# Patient Record
Sex: FEMALE | Race: BLACK OR AFRICAN AMERICAN | Employment: FULL TIME | URBAN - METROPOLITAN AREA
[De-identification: names, ages, dates, MRNs, and addresses within clinical notes are randomized per-mention and may not be internally consistent; named-entity substitution may affect disease eponyms.]

---

## 2018-07-03 ENCOUNTER — HOSPITAL ENCOUNTER (OUTPATIENT)
Age: 48
Discharge: HOME | End: 2018-07-03
Attending: INTERNAL MEDICINE
Payer: COMMERCIAL

## 2018-07-03 DIAGNOSIS — D35.2 PITUITARY ADENOMA (HCC): ICD-10-CM

## 2018-07-03 LAB — CREAT UR-MCNC: 0.9 MG/DL (ref 0.6–1.3)

## 2018-07-03 PROCEDURE — 74011636320 HC RX REV CODE- 636/320

## 2018-07-03 PROCEDURE — A9575 INJ GADOTERATE MEGLUMI 0.1ML: HCPCS

## 2018-07-03 PROCEDURE — 70553 MRI BRAIN STEM W/O & W/DYE: CPT

## 2018-07-03 PROCEDURE — 82565 ASSAY OF CREATININE: CPT

## 2018-07-03 RX ADMIN — GADOTERATE MEGLUMINE 16 ML: 376.9 INJECTION INTRAVENOUS at 16:00

## 2019-03-15 ENCOUNTER — HOSPITAL ENCOUNTER (OUTPATIENT)
Age: 49
Discharge: HOME | End: 2019-03-15
Attending: INTERNAL MEDICINE
Payer: COMMERCIAL

## 2019-03-15 DIAGNOSIS — D35.2 BENIGN NEOPLASM OF PITUITARY GLAND (HCC): ICD-10-CM

## 2019-03-15 PROCEDURE — A9575 INJ GADOTERATE MEGLUMI 0.1ML: HCPCS

## 2019-03-15 PROCEDURE — 70553 MRI BRAIN STEM W/O & W/DYE: CPT

## 2019-03-15 PROCEDURE — 74011636320 HC RX REV CODE- 636/320

## 2019-03-15 RX ADMIN — GADOTERATE MEGLUMINE 17 ML: 376.9 INJECTION INTRAVENOUS at 14:00

## 2021-08-24 ENCOUNTER — TRANSCRIBE ORDER (OUTPATIENT)
Dept: SCHEDULING | Age: 51
End: 2021-08-24

## 2021-08-24 DIAGNOSIS — D35.3 BENIGN NEOPLASM OF PITUITARY GLAND AND CRANIOPHARYNGEAL DUCT (POUCH) (HCC): Primary | ICD-10-CM

## 2021-08-24 DIAGNOSIS — D35.2 BENIGN NEOPLASM OF PITUITARY GLAND AND CRANIOPHARYNGEAL DUCT (POUCH) (HCC): Primary | ICD-10-CM

## 2021-09-14 ENCOUNTER — HOSPITAL ENCOUNTER (OUTPATIENT)
Age: 51
Discharge: HOME | End: 2021-09-14
Attending: INTERNAL MEDICINE
Payer: COMMERCIAL

## 2021-09-14 VITALS — WEIGHT: 200 LBS

## 2021-09-14 DIAGNOSIS — D35.2 BENIGN NEOPLASM OF PITUITARY GLAND AND CRANIOPHARYNGEAL DUCT (POUCH) (HCC): ICD-10-CM

## 2021-09-14 DIAGNOSIS — D35.3 BENIGN NEOPLASM OF PITUITARY GLAND AND CRANIOPHARYNGEAL DUCT (POUCH) (HCC): ICD-10-CM

## 2021-09-14 LAB — CREAT UR-MCNC: 1 MG/DL (ref 0.6–1.3)

## 2021-09-14 PROCEDURE — 70553 MRI BRAIN STEM W/O & W/DYE: CPT

## 2021-09-14 PROCEDURE — 74011250636 HC RX REV CODE- 250/636

## 2021-09-14 PROCEDURE — 82565 ASSAY OF CREATININE: CPT

## 2021-09-14 PROCEDURE — A9577 INJ MULTIHANCE: HCPCS

## 2021-09-14 RX ADMIN — GADOBENATE DIMEGLUMINE 17 ML: 529 INJECTION, SOLUTION INTRAVENOUS at 15:42

## 2021-11-24 ENCOUNTER — TRANSCRIBE ORDER (OUTPATIENT)
Dept: SCHEDULING | Age: 51
End: 2021-11-24

## 2021-11-24 DIAGNOSIS — D35.2 PITUITARY ADENOMA (HCC): Primary | ICD-10-CM

## 2022-05-02 ENCOUNTER — HOSPITAL ENCOUNTER (OUTPATIENT)
Age: 52
Discharge: HOME | End: 2022-05-02
Attending: NEUROLOGICAL SURGERY
Payer: COMMERCIAL

## 2022-05-02 DIAGNOSIS — D35.2 PITUITARY ADENOMA (HCC): ICD-10-CM

## 2022-05-02 LAB — CREAT UR-MCNC: 0.9 MG/DL (ref 0.6–1.3)

## 2022-05-02 PROCEDURE — A9576 INJ PROHANCE MULTIPACK: HCPCS

## 2022-05-02 PROCEDURE — 74011250636 HC RX REV CODE- 250/636

## 2022-05-02 PROCEDURE — 70553 MRI BRAIN STEM W/O & W/DYE: CPT

## 2022-05-02 PROCEDURE — 82565 ASSAY OF CREATININE: CPT

## 2022-05-02 RX ADMIN — GADOTERIDOL 20 ML: 279.3 INJECTION, SOLUTION INTRAVENOUS at 08:25

## 2024-02-09 ENCOUNTER — OFFICE VISIT (OUTPATIENT)
Age: 54
End: 2024-02-09
Payer: COMMERCIAL

## 2024-02-09 VITALS
HEART RATE: 79 BPM | BODY MASS INDEX: 32.14 KG/M2 | TEMPERATURE: 97.3 F | DIASTOLIC BLOOD PRESSURE: 70 MMHG | RESPIRATION RATE: 16 BRPM | HEIGHT: 66 IN | SYSTOLIC BLOOD PRESSURE: 110 MMHG | OXYGEN SATURATION: 95 % | WEIGHT: 200 LBS

## 2024-02-09 DIAGNOSIS — Z79.01 LONG TERM (CURRENT) USE OF ANTICOAGULANTS: ICD-10-CM

## 2024-02-09 DIAGNOSIS — R07.89 OTHER CHEST PAIN: ICD-10-CM

## 2024-02-09 DIAGNOSIS — I27.20 PULMONARY HYPERTENSION (HCC): ICD-10-CM

## 2024-02-09 DIAGNOSIS — I26.93 SINGLE SUBSEGMENTAL PULMONARY EMBOLISM WITHOUT ACUTE COR PULMONALE (HCC): ICD-10-CM

## 2024-02-09 DIAGNOSIS — R01.1 SYSTOLIC MURMUR: ICD-10-CM

## 2024-02-09 DIAGNOSIS — I10 PRIMARY HYPERTENSION: Primary | ICD-10-CM

## 2024-02-09 DIAGNOSIS — R06.02 EXERTIONAL SHORTNESS OF BREATH: ICD-10-CM

## 2024-02-09 PROBLEM — R53.83 FATIGUE: Status: ACTIVE | Noted: 2022-12-15

## 2024-02-09 PROBLEM — E55.9 VITAMIN D DEFICIENCY: Status: ACTIVE | Noted: 2023-09-20

## 2024-02-09 PROBLEM — R51.9 HEADACHE: Status: ACTIVE | Noted: 2022-12-15

## 2024-02-09 PROBLEM — E66.9 OBESITY: Status: ACTIVE | Noted: 2023-11-20

## 2024-02-09 PROBLEM — U07.1 COVID-19 VIRUS INFECTION: Status: ACTIVE | Noted: 2023-12-30

## 2024-02-09 PROBLEM — D35.2 BENIGN NEOPLASM OF PITUITARY GLAND (HCC): Status: ACTIVE | Noted: 2024-02-09

## 2024-02-09 PROBLEM — I26.99 ACUTE PULMONARY EMBOLISM (HCC): Status: ACTIVE | Noted: 2023-12-30

## 2024-02-09 PROBLEM — D50.0 ANEMIA DUE TO CHRONIC BLOOD LOSS: Status: ACTIVE | Noted: 2024-02-09

## 2024-02-09 PROBLEM — M65.30 TRIGGER FINGER OF RIGHT HAND: Status: ACTIVE | Noted: 2023-06-15

## 2024-02-09 PROBLEM — D64.9 ANEMIA: Status: ACTIVE | Noted: 2023-08-11

## 2024-02-09 PROBLEM — N93.9 VAGINAL BLEEDING: Status: ACTIVE | Noted: 2023-08-11

## 2024-02-09 PROBLEM — J01.90 ACUTE SINUSITIS: Status: ACTIVE | Noted: 2022-12-15

## 2024-02-09 PROBLEM — J30.2 PERENNIAL ALLERGIC RHINITIS WITH SEASONAL VARIATION: Status: ACTIVE | Noted: 2023-08-23

## 2024-02-09 PROBLEM — J30.89 PERENNIAL ALLERGIC RHINITIS WITH SEASONAL VARIATION: Status: ACTIVE | Noted: 2023-08-23

## 2024-02-09 PROBLEM — J30.9 ALLERGIC RHINITIS: Status: ACTIVE | Noted: 2022-12-15

## 2024-02-09 PROBLEM — R60.0 PERIPHERAL EDEMA: Status: ACTIVE | Noted: 2023-09-14

## 2024-02-09 PROBLEM — R60.9 PERIPHERAL EDEMA: Status: ACTIVE | Noted: 2023-09-14

## 2024-02-09 PROBLEM — D35.2 PITUITARY ADENOMA (HCC): Status: ACTIVE | Noted: 2019-09-19

## 2024-02-09 PROBLEM — R06.00 DYSPNEA: Status: ACTIVE | Noted: 2022-12-14

## 2024-02-09 PROBLEM — S49.92XA ARM INJURY, LEFT, INITIAL ENCOUNTER: Status: ACTIVE | Noted: 2020-11-18

## 2024-02-09 PROBLEM — J30.1 ALLERGIC RHINITIS DUE TO POLLEN: Status: ACTIVE | Noted: 2023-08-23

## 2024-02-09 PROBLEM — D62 ANEMIA DUE TO ACUTE BLOOD LOSS: Status: ACTIVE | Noted: 2024-02-09

## 2024-02-09 PROBLEM — G47.30 SLEEP APNEA: Status: ACTIVE | Noted: 2023-05-12

## 2024-02-09 PROBLEM — E78.00 HYPERCHOLESTEROLEMIA: Status: ACTIVE | Noted: 2023-07-21

## 2024-02-09 PROCEDURE — 3078F DIAST BP <80 MM HG: CPT | Performed by: INTERNAL MEDICINE

## 2024-02-09 PROCEDURE — 99214 OFFICE O/P EST MOD 30 MIN: CPT | Performed by: INTERNAL MEDICINE

## 2024-02-09 PROCEDURE — 3074F SYST BP LT 130 MM HG: CPT | Performed by: INTERNAL MEDICINE

## 2024-02-09 RX ORDER — BENZONATATE 100 MG/1
100 CAPSULE ORAL 2 TIMES DAILY PRN
COMMUNITY
Start: 2024-01-29

## 2024-02-09 RX ORDER — FLUTICASONE PROPIONATE 50 MCG
1 SPRAY, SUSPENSION (ML) NASAL DAILY
COMMUNITY
Start: 2024-01-29

## 2024-02-09 RX ORDER — CYCLOSPORINE 0.5 MG/ML
2 EMULSION OPHTHALMIC PRN
COMMUNITY

## 2024-02-09 RX ORDER — DIFLUPREDNATE OPHTHALMIC 0.5 MG/ML
1 EMULSION OPHTHALMIC PRN
COMMUNITY

## 2024-02-09 RX ORDER — ERGOCALCIFEROL 1.25 MG/1
50000 CAPSULE ORAL WEEKLY
COMMUNITY

## 2024-02-09 RX ORDER — IRBESARTAN AND HYDROCHLOROTHIAZIDE 300; 12.5 MG/1; MG/1
1 TABLET, FILM COATED ORAL DAILY
COMMUNITY
Start: 2023-07-24

## 2024-02-09 RX ORDER — RIVAROXABAN 20 MG/1
20 TABLET, FILM COATED ORAL DAILY
COMMUNITY
Start: 2023-08-07

## 2024-02-09 RX ORDER — CABERGOLINE 0.5 MG/1
0.5 TABLET ORAL WEEKLY
COMMUNITY

## 2024-02-09 RX ORDER — SEMAGLUTIDE 0.5 MG/.5ML
0.5 INJECTION, SOLUTION SUBCUTANEOUS
COMMUNITY

## 2024-02-09 RX ORDER — FERROUS SULFATE 325(65) MG
1 TABLET ORAL DAILY
COMMUNITY
Start: 2024-01-30

## 2024-02-09 RX ORDER — AMLODIPINE BESYLATE 5 MG
5 TABLET ORAL DAILY
COMMUNITY
Start: 2024-01-15

## 2024-02-09 ASSESSMENT — ENCOUNTER SYMPTOMS
ALLERGIC/IMMUNOLOGIC NEGATIVE: 1
EYES NEGATIVE: 1
SHORTNESS OF BREATH: 0
GASTROINTESTINAL NEGATIVE: 1

## 2024-02-09 ASSESSMENT — LIFESTYLE VARIABLES: HOW OFTEN DO YOU HAVE A DRINK CONTAINING ALCOHOL: MONTHLY OR LESS

## 2024-02-09 NOTE — PROGRESS NOTES
Melodie Mcnamara (:  1970) is a 53 y.o. female,Established patient, here for evaluation of the following chief complaint(s):  Follow-up (1 year F/U.)    Subjective   SUBJECTIVE/OBJECTIVE:  HPI  Patient presents today for follow-up. She has a history of poorly-controlled blood pressure. She has a history of a pituitary adenoma with an elevated prolactin level.     Today, she is doing relatively well.  She is concerned about her weight but otherwise stable.  No chest discomfort.  She has had shortness of breath and chest pain previously but that seemingly has dissipated.  She is able to ambulate without restriction.  Some malaise and fatigue persist but otherwise stable. She has gained weight as previously mentioned which has been concerning to her. She has malaise and fatigue and nighttime snoring. I was asked to evaluate her cardiac status and make further recommendations. Patient has no history of diabetes, but does have hypertension and hyperlipidemia. She is a nonsmoker. No strong family history of heart disease. Patient has noted some cramping in her legs bilaterally especially at night when she lies down. Patient has no history of known coronary disease or heart failure.  Patient had a pulmonary embolus last summer felt due to birth control pills and is currently on Xarelto.          I personally reviewed all available medical records, previous office notes, radiology reports and all available laboratory studies and procedural reports.    Past Medical History:   Diagnosis Date    Hypertension         History reviewed. No pertinent surgical history.     No Known Allergies     Current Outpatient Medications   Medication Sig Dispense Refill    NORVASC 5 MG tablet Take 1 tablet by mouth daily      benzonatate (TESSALON) 100 MG capsule Take 1 capsule by mouth 2 times daily as needed for Cough      cabergoline (DOSTINEX) 0.5 MG tablet Take 1 tablet by mouth once a week      cycloSPORINE (RESTASIS) 0.05 %

## 2024-08-28 DIAGNOSIS — R06.02 EXERTIONAL SHORTNESS OF BREATH: ICD-10-CM

## 2024-08-28 DIAGNOSIS — R07.89 OTHER CHEST PAIN: Primary | ICD-10-CM

## 2024-08-28 DIAGNOSIS — R01.1 SYSTOLIC MURMUR: ICD-10-CM

## 2024-09-06 RX ORDER — DESOGESTREL AND ETHINYL ESTRADIOL 21-5 (28)
1 KIT ORAL DAILY
COMMUNITY

## 2024-09-06 RX ORDER — PREDNISOLONE ACETATE 10 MG/ML
SUSPENSION/ DROPS OPHTHALMIC
COMMUNITY
Start: 2024-07-24

## 2024-09-06 RX ORDER — DICLOFENAC SODIUM 1 MG/ML
SOLUTION/ DROPS OPHTHALMIC
COMMUNITY
Start: 2024-08-25

## 2024-09-06 RX ORDER — FLUCONAZOLE 150 MG/1
TABLET ORAL
COMMUNITY

## 2024-09-06 RX ORDER — CYCLOPENTOLATE HYDROCHLORIDE 5 MG/ML
SOLUTION/ DROPS OPHTHALMIC
COMMUNITY
Start: 2024-07-24

## 2024-09-06 RX ORDER — AZELASTINE HYDROCHLORIDE, FLUTICASONE PROPIONATE 137; 50 UG/1; UG/1
SPRAY, METERED NASAL
COMMUNITY
Start: 2024-05-20

## 2024-09-06 RX ORDER — TOPIRAMATE 25 MG/1
TABLET, FILM COATED ORAL
COMMUNITY

## 2024-09-06 RX ORDER — TOPIRAMATE 50 MG/1
1 TABLET, FILM COATED ORAL 2 TIMES DAILY
COMMUNITY

## 2024-09-06 RX ORDER — ATORVASTATIN CALCIUM 40 MG/1
40 TABLET, FILM COATED ORAL DAILY
COMMUNITY
Start: 2024-07-03

## 2024-09-06 RX ORDER — CLINDAMYCIN HCL 300 MG
1 CAPSULE ORAL 2 TIMES DAILY
COMMUNITY
End: 2024-09-09

## 2024-09-06 RX ORDER — ALBUTEROL SULFATE 90 UG/1
AEROSOL, METERED RESPIRATORY (INHALATION)
COMMUNITY
Start: 2024-05-20

## 2024-09-09 ENCOUNTER — OFFICE VISIT (OUTPATIENT)
Age: 54
End: 2024-09-09
Payer: COMMERCIAL

## 2024-09-09 VITALS
TEMPERATURE: 97 F | WEIGHT: 195 LBS | HEIGHT: 66 IN | HEART RATE: 63 BPM | OXYGEN SATURATION: 97 % | SYSTOLIC BLOOD PRESSURE: 122 MMHG | BODY MASS INDEX: 31.34 KG/M2 | DIASTOLIC BLOOD PRESSURE: 82 MMHG

## 2024-09-09 DIAGNOSIS — Z79.01 LONG TERM (CURRENT) USE OF ANTICOAGULANTS: ICD-10-CM

## 2024-09-09 DIAGNOSIS — I10 PRIMARY HYPERTENSION: ICD-10-CM

## 2024-09-09 DIAGNOSIS — R06.02 EXERTIONAL SHORTNESS OF BREATH: ICD-10-CM

## 2024-09-09 DIAGNOSIS — R01.1 SYSTOLIC MURMUR: ICD-10-CM

## 2024-09-09 DIAGNOSIS — I27.20 PULMONARY HYPERTENSION (HCC): ICD-10-CM

## 2024-09-09 DIAGNOSIS — R07.89 OTHER CHEST PAIN: Primary | ICD-10-CM

## 2024-09-09 DIAGNOSIS — I26.93 SINGLE SUBSEGMENTAL PULMONARY EMBOLISM WITHOUT ACUTE COR PULMONALE (HCC): ICD-10-CM

## 2024-09-09 PROCEDURE — 3074F SYST BP LT 130 MM HG: CPT | Performed by: INTERNAL MEDICINE

## 2024-09-09 PROCEDURE — 3079F DIAST BP 80-89 MM HG: CPT | Performed by: INTERNAL MEDICINE

## 2024-09-09 PROCEDURE — 99214 OFFICE O/P EST MOD 30 MIN: CPT | Performed by: INTERNAL MEDICINE

## 2024-09-09 ASSESSMENT — PATIENT HEALTH QUESTIONNAIRE - PHQ9
SUM OF ALL RESPONSES TO PHQ QUESTIONS 1-9: 0
SUM OF ALL RESPONSES TO PHQ9 QUESTIONS 1 & 2: 0
SUM OF ALL RESPONSES TO PHQ QUESTIONS 1-9: 0
1. LITTLE INTEREST OR PLEASURE IN DOING THINGS: NOT AT ALL
2. FEELING DOWN, DEPRESSED OR HOPELESS: NOT AT ALL

## 2024-09-09 ASSESSMENT — ENCOUNTER SYMPTOMS
ALLERGIC/IMMUNOLOGIC NEGATIVE: 1
EYES NEGATIVE: 1
GASTROINTESTINAL NEGATIVE: 1
SHORTNESS OF BREATH: 0

## 2024-12-24 ENCOUNTER — HOSPITAL ENCOUNTER (OUTPATIENT)
Facility: HOSPITAL | Age: 54
Setting detail: RECURRING SERIES
Discharge: HOME | End: 2024-12-27
Payer: COMMERCIAL

## 2024-12-24 PROCEDURE — 97162 PT EVAL MOD COMPLEX 30 MIN: CPT | Performed by: PHYSICAL THERAPIST

## 2024-12-24 PROCEDURE — 97110 THERAPEUTIC EXERCISES: CPT | Performed by: PHYSICAL THERAPIST

## 2024-12-24 NOTE — PROGRESS NOTES
progress therapeutic interventions, analyze and address functional mobility deficits, analyze and address ROM deficits, analyze and address soft tissue restrictions, analyze and cue for proper movement patterns, analyze and modify for postural abnormalities, and instruct in home and community integration to address functional deficits and attain remaining goals.    [x]  See Plan of Care - for goals and assessment     PLAN  - Continue Plan of Care  - Upgrade activities as tolerated    Segun Goel, PT 12/24/2024  8:35 AM  If an interpreting service was utilized for treatment of this patient, the contents of this document represent the material reviewed with the patient via the .
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
## 2024-12-30 ENCOUNTER — APPOINTMENT (OUTPATIENT)
Facility: HOSPITAL | Age: 54
End: 2024-12-30
Payer: COMMERCIAL

## 2024-12-30 ENCOUNTER — TELEPHONE (OUTPATIENT)
Facility: HOSPITAL | Age: 54
End: 2024-12-30

## 2025-01-07 ENCOUNTER — HOSPITAL ENCOUNTER (OUTPATIENT)
Facility: HOSPITAL | Age: 55
Setting detail: RECURRING SERIES
End: 2025-01-07
Payer: COMMERCIAL

## 2025-01-07 ENCOUNTER — TELEPHONE (OUTPATIENT)
Facility: HOSPITAL | Age: 55
End: 2025-01-07

## 2025-01-07 NOTE — TELEPHONE ENCOUNTER
Patient cxl <24 due to just getting over covid and still feeling a little weak. Patient confirmed next appt

## 2025-01-09 ENCOUNTER — HOSPITAL ENCOUNTER (OUTPATIENT)
Facility: HOSPITAL | Age: 55
Setting detail: RECURRING SERIES
Discharge: HOME | End: 2025-01-12
Payer: COMMERCIAL

## 2025-01-09 PROCEDURE — 97110 THERAPEUTIC EXERCISES: CPT

## 2025-01-09 PROCEDURE — 97112 NEUROMUSCULAR REEDUCATION: CPT

## 2025-01-09 NOTE — PROGRESS NOTES
PHYSICAL / OCCUPATIONAL THERAPY - DAILY TREATMENT NOTE    Patient Name: Melodie Mcnamara    Date: 2025    : 1970  Insurance: Payor: PRABHA LUZBS / [place] / Product Type: *No Product type* /      Patient  verified Yes     Visit #   Current / Total 2 16   Time   In / Out 757 836   Pain   In / Out 7 3-4   Subjective Functional Status/Changes: Patient states after last therapy session she had a lot of increase pain for a couple of days. Patient had an MRI complete of neck on 25.     TREATMENT AREA =  Other low back pain [M54.59]     OBJECTIVE    Heat (UNBILLED):  location/position: sitting; neck and back .    Min Rationale   10 decrease pain to improve patient's ability to progress to PLOF and address remaining functional goals.     Skin assessment post-treatment:   Intact     Therapeutic Procedures:    06048 Therapeutic Exercise (timed):  increase ROM, strength, coordination, balance, and proprioception to improve patient's ability to progress to PLOF and address remaining functional goals.   Tx Min Billable or 1:1 Min   (if diff from Tx Min) Details:   20  See flow sheet as applicable     67717 Neuromuscular Re-Education (timed):  improve balance, coordination, kinesthetic sense, posture, core stability and proprioception to improve patient's ability to develop conscious control of individual muscles and awareness of position of extremities in order to progress to PLOF and address remaining functional goals.   Tx Min Billable or 1:1 Min   (if diff from Tx Min) Details:   9  See flow sheet as applicable       29   BC Totals Reminder: bill using total billable min of TIMED therapeutic procedures (example: do not include dry needle or estim unattended, both untimed codes, in totals to left)  8-22 min = 1 unit; 23-37 min = 2 units; 38-52 min = 3 units; 53-67 min = 4 units; 68-82 min = 5 units   Total Total     [x]  Patient Education billed concurrently with other procedures   [x] Review HEP    []

## 2025-01-14 ENCOUNTER — HOSPITAL ENCOUNTER (OUTPATIENT)
Facility: HOSPITAL | Age: 55
Setting detail: RECURRING SERIES
Discharge: HOME | End: 2025-01-17
Payer: COMMERCIAL

## 2025-01-14 PROCEDURE — 97110 THERAPEUTIC EXERCISES: CPT

## 2025-01-14 PROCEDURE — 97530 THERAPEUTIC ACTIVITIES: CPT

## 2025-01-14 PROCEDURE — 97112 NEUROMUSCULAR REEDUCATION: CPT

## 2025-01-14 NOTE — TELEPHONE ENCOUNTER
PCP: Pavel Canas MD    Last appt:  9/9/2024   Future Appointments   Date Time Provider Department Center   1/16/2025  7:40 AM Bassem Douglas, PTA [facility]   1/21/2025  7:40 AM Naheed Goins, PT [facility]   1/23/2025  7:40 AM Bassem Douglas, PTA [facility]   1/28/2025  7:40 AM Bassem Douglas, PTA [facility]   2/4/2025  7:40 AM Bassem Douglas, PTA [facility]   2/6/2025  7:40 AM Bassem Douglas, PTA [facility]   9/9/2025  9:15 AM Handy Lee Sr., MD [facility]       Requested Prescriptions     Pending Prescriptions Disp Refills    irbesartan-hydroCHLOROthiazide (AVALIDE) 300-12.5 MG per tablet 90 tablet 3     Sig: Take 1 tablet by mouth daily       Request for a 30 or 90 day supply? Provider Discretion    Pharmacy: Confirmed     Other Comments: n/a

## 2025-01-14 NOTE — PROGRESS NOTES
PHYSICAL / OCCUPATIONAL THERAPY - DAILY TREATMENT NOTE    Patient Name: Melodie Mcnamara    Date: 2025    : 1970  Insurance: Payor: PRABHA LUZBS / [place] / Product Type: *No Product type* /      Patient  verified Yes     Visit #   Current / Total 3 16   Time   In / Out 747 837   Pain   In / Out 5 5   Subjective Functional Status/Changes: Patient states that she did have a little pain after previous session, but nothing too bad as it was after the first session.      TREATMENT AREA =  Other low back pain [M54.59]     OBJECTIVE    Heat (UNBILLED):  location/position: sitting; neck and back  .    Min Rationale   10 decrease pain to improve patient's ability to progress to PLOF and address remaining functional goals.     Skin assessment post-treatment:   Intact     Therapeutic Procedures:    57038 Therapeutic Exercise (timed):  increase ROM, strength, coordination, balance, and proprioception to improve patient's ability to progress to PLOF and address remaining functional goals.   Tx Min Billable or 1:1 Min   (if diff from Tx Min) Details:   15  See flow sheet as applicable     31923 Neuromuscular Re-Education (timed):  improve balance, coordination, kinesthetic sense, posture, core stability and proprioception to improve patient's ability to develop conscious control of individual muscles and awareness of position of extremities in order to progress to PLOF and address remaining functional goals.   Tx Min Billable or 1:1 Min   (if diff from Tx Min) Details:   15  See flow sheet as applicable     58656 Therapeutic Activity (timed):  use of dynamic activities replicating functional movements to increase ROM, strength, coordination, balance, and proprioception in order to improve patient's ability to progress to PLOF and address remaining functional goals.   Tx Min Billable or 1:1 Min   (if diff from Tx Min) Details:   10  See flow sheet as applicable       40  [facility] BC Totals Reminder: bill using total

## 2025-01-16 ENCOUNTER — HOSPITAL ENCOUNTER (OUTPATIENT)
Facility: HOSPITAL | Age: 55
Setting detail: RECURRING SERIES
Discharge: HOME | End: 2025-01-19
Payer: COMMERCIAL

## 2025-01-16 PROCEDURE — 97110 THERAPEUTIC EXERCISES: CPT

## 2025-01-16 PROCEDURE — 97530 THERAPEUTIC ACTIVITIES: CPT

## 2025-01-16 PROCEDURE — 97112 NEUROMUSCULAR REEDUCATION: CPT

## 2025-01-16 NOTE — PROGRESS NOTES
PHYSICAL / OCCUPATIONAL THERAPY - DAILY TREATMENT NOTE    Patient Name: Melodie Mcnamara    Date: 2025    : 1970  Insurance: Payor: PRABHA CURRY / [place] / Product Type: *No Product type* /      Patient  verified Yes     Visit #   Current / Total 4 16   Time   In / Out 742 830   Pain   In / Out 5 6   Subjective Functional Status/Changes: Patient states that she followed up with the doctor for the MRI of her neck and he states she has a pinch nerve in the neck and wants her to attend therapy.      TREATMENT AREA =  Other low back pain [M54.59]     OBJECTIVE    Heat (UNBILLED):  location/position: sitting; neck and back  .    Min Rationale   10 decrease pain to improve patient's ability to progress to PLOF and address remaining functional goals.     Skin assessment post-treatment:   Intact      Therapeutic Procedures:     51242 Therapeutic Exercise (timed):  increase ROM, strength, coordination, balance, and proprioception to improve patient's ability to progress to PLOF and address remaining functional goals.   Tx Min Billable or 1:1 Min   (if diff from Tx Min) Details:   15   See flow sheet as applicable      78625 Neuromuscular Re-Education (timed):  improve balance, coordination, kinesthetic sense, posture, core stability and proprioception to improve patient's ability to develop conscious control of individual muscles and awareness of position of extremities in order to progress to PLOF and address remaining functional goals.   Tx Min Billable or 1:1 Min   (if diff from Tx Min) Details:   15   See flow sheet as applicable      15715 Therapeutic Activity (timed):  use of dynamic activities replicating functional movements to increase ROM, strength, coordination, balance, and proprioception in order to improve patient's ability to progress to PLOF and address remaining functional goals.   Tx Min Billable or 1:1 Min   (if diff from Tx Min) Details:   8   See flow sheet as applicable         38   MC

## 2025-01-21 ENCOUNTER — HOSPITAL ENCOUNTER (OUTPATIENT)
Facility: HOSPITAL | Age: 55
Setting detail: RECURRING SERIES
Discharge: HOME | End: 2025-01-24
Payer: COMMERCIAL

## 2025-01-21 PROCEDURE — 97110 THERAPEUTIC EXERCISES: CPT

## 2025-01-21 PROCEDURE — 97112 NEUROMUSCULAR REEDUCATION: CPT
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
## 2025-01-21 NOTE — PROGRESS NOTES
PHYSICAL / OCCUPATIONAL THERAPY - DAILY TREATMENT NOTE    Patient Name: Melodie Mcnamara    Date: 2025    : 1970  Insurance: Payor: PRABHA LUZBS / [place] / Product Type: *No Product type* /      Patient  verified Yes     Visit #   Current / Total 5 16   Time   In / Out 738 819   Pain   In / Out 7 10   Subjective Functional Status/Changes: Patient states that she went to the gym this morning and did 50 minutes on the treadmill and did okay. She notes new order for her neck with symptoms noted in the neck and down the LUE and increases with lifting and carrying items and overhead activities is limited and hard to do because of numbness and tingling.at times will drop items out of the L hand when holding onto it. Headaches prior ot thi incident but the headaches till lingering.     I feel back in the whole spine the pain is present, since starting for the low back I do feel some improvement, it was 7+/10 daily and now it is not the case.      TREATMENT AREA =  Other low back pain [M54.59]     OBJECTIVE    Heat (UNBILLED):  location/position: sitting; neck and back  .    Min Rationale   10 decrease pain to improve patient's ability to progress to PLOF and address remaining functional goals.     Skin assessment post-treatment:   Intact      Therapeutic Procedures:     74831 Therapeutic Exercise (timed):  increase ROM, strength, coordination, balance, and proprioception to improve patient's ability to progress to PLOF and address remaining functional goals.   Tx Min Billable or 1:1 Min   (if diff from Tx Min) Details:   16   See flow sheet as applicable      54121 Neuromuscular Re-Education (timed):  improve balance, coordination, kinesthetic sense, posture, core stability and proprioception to improve patient's ability to develop conscious control of individual muscles and awareness of position of extremities in order to progress to PLOF and address remaining functional goals.   Tx Min Billable or 1:1 Min

## 2025-01-22 RX ORDER — IRBESARTAN AND HYDROCHLOROTHIAZIDE 300; 12.5 MG/1; MG/1
1 TABLET, FILM COATED ORAL DAILY
Qty: 90 TABLET | Refills: 3 | Status: SHIPPED | OUTPATIENT
Start: 2025-01-22

## 2025-01-23 ENCOUNTER — APPOINTMENT (OUTPATIENT)
Facility: HOSPITAL | Age: 55
End: 2025-01-23
Payer: COMMERCIAL

## 2025-01-28 ENCOUNTER — HOSPITAL ENCOUNTER (OUTPATIENT)
Facility: HOSPITAL | Age: 55
Setting detail: RECURRING SERIES
Discharge: HOME | End: 2025-01-31
Payer: COMMERCIAL

## 2025-01-28 PROCEDURE — 97112 NEUROMUSCULAR REEDUCATION: CPT

## 2025-01-28 PROCEDURE — 97110 THERAPEUTIC EXERCISES: CPT

## 2025-01-28 NOTE — PROGRESS NOTES
PHYSICAL / OCCUPATIONAL THERAPY - DAILY TREATMENT NOTE    Patient Name: Melodie Mcnamara    Date: 2025    : 1970  Insurance: [place] / [place] / Product Type: *No Product type* /      Patient  verified Yes     Visit #   Current / Total 1 16   Time   In / Out 749 834   Pain   In / Out Neck 6; LBP 4 8   Subjective Functional Status/Changes: Patient states she has not initiated neck exercises due to fear of pain and aggravation.      TREATMENT AREA =  Other low back pain [M54.59]     OBJECTIVE    Heat (UNBILLED):  location/position: sitting; neck and back  .    Min Rationale   10 decrease pain to improve patient's ability to progress to PLOF and address remaining functional goals.     Skin assessment post-treatment:   Intact     Therapeutic Procedures:    27124 Therapeutic Exercise (timed):  increase ROM, strength, coordination, balance, and proprioception to improve patient's ability to progress to PLOF and address remaining functional goals.   Tx Min Billable or 1:1 Min   (if diff from Tx Min) Details:   20  See flow sheet as applicable     46014 Neuromuscular Re-Education (timed):  improve balance, coordination, kinesthetic sense, posture, core stability and proprioception to improve patient's ability to develop conscious control of individual muscles and awareness of position of extremities in order to progress to PLOF and address remaining functional goals.   Tx Min Billable or 1:1 Min   (if diff from Tx Min) Details:   15  See flow sheet as applicable       35  [facility] Totals Reminder: bill using total billable min of TIMED therapeutic procedures (example: do not include dry needle or estim unattended, both untimed codes, in totals to left)  8-22 min = 1 unit; 23-37 min = 2 units; 38-52 min = 3 units; 53-67 min = 4 units; 68-82 min = 5 units   Total Total     [x]  Patient Education billed concurrently with other procedures   [x] Review HEP    [] Progressed/Changed HEP, detail:    [] Other

## 2025-01-30 ENCOUNTER — APPOINTMENT (OUTPATIENT)
Facility: HOSPITAL | Age: 55
End: 2025-01-30
Payer: COMMERCIAL

## 2025-02-04 ENCOUNTER — HOSPITAL ENCOUNTER (OUTPATIENT)
Facility: HOSPITAL | Age: 55
Setting detail: RECURRING SERIES
Discharge: HOME | End: 2025-02-07
Payer: COMMERCIAL

## 2025-02-04 PROCEDURE — 97112 NEUROMUSCULAR REEDUCATION: CPT

## 2025-02-04 PROCEDURE — 97110 THERAPEUTIC EXERCISES: CPT

## 2025-02-04 PROCEDURE — 97140 MANUAL THERAPY 1/> REGIONS: CPT

## 2025-02-04 NOTE — PROGRESS NOTES
PHYSICAL / OCCUPATIONAL THERAPY - DAILY TREATMENT NOTE    Patient Name: Melodie Mcnamara    Date: 2025    : 1970  Insurance: Payor: PRABHA CURRY / [place] / Product Type: *No Product type* /      Patient  verified Yes     Visit #   Current / Total 2 16   Time   In / Out 748 842   Pain   In / Out Neck 6; back 3 7   Subjective Functional Status/Changes: Patient states she never received a print out of neck exercises so she has not been performing them.      TREATMENT AREA =  Other low back pain [M54.59]     OBJECTIVE    Heat (UNBILLED):  location/position: sitting; neck & back .    Min Rationale   10 decrease pain to improve patient's ability to progress to PLOF and address remaining functional goals.     Skin assessment post-treatment:   Intact     Therapeutic Procedures:    90586 Therapeutic Exercise (timed):  increase ROM, strength, coordination, balance, and proprioception to improve patient's ability to progress to PLOF and address remaining functional goals.   Tx Min Billable or 1:1 Min   (if diff from Tx Min) Details:   24  See flow sheet as applicable     14393 Neuromuscular Re-Education (timed):  improve balance, coordination, kinesthetic sense, posture, core stability and proprioception to improve patient's ability to develop conscious control of individual muscles and awareness of position of extremities in order to progress to PLOF and address remaining functional goals.   Tx Min Billable or 1:1 Min   (if diff from Tx Min) Details:   10  See flow sheet as applicable     04412 Manual Therapy (timed):  decrease pain, increase ROM, increase tissue extensibility, decrease edema, decrease trigger points, and increase postural awareness to improve patient's ability to progress to PLOF and address remaining functional goals.  The manual therapy interventions were performed at a separate and distinct time from the therapeutic activities interventions .   Tx Min Billable or 1:1 Min   (if diff from Tx

## 2025-02-06 ENCOUNTER — HOSPITAL ENCOUNTER (OUTPATIENT)
Facility: HOSPITAL | Age: 55
Setting detail: RECURRING SERIES
Discharge: HOME | End: 2025-02-09
Payer: COMMERCIAL

## 2025-02-06 PROCEDURE — 97140 MANUAL THERAPY 1/> REGIONS: CPT

## 2025-02-06 PROCEDURE — 97112 NEUROMUSCULAR REEDUCATION: CPT

## 2025-02-06 PROCEDURE — 97110 THERAPEUTIC EXERCISES: CPT

## 2025-02-06 NOTE — PROGRESS NOTES
PHYSICAL / OCCUPATIONAL THERAPY - DAILY TREATMENT NOTE    Patient Name: Melodie Mcnamara    Date: 2025    : 1970  Insurance: Payor: PRABHA LUZBS / [place] / Product Type: *No Product type* /      Patient  verified Yes     Visit #   Current / Total 3 16   Time   In / Out 745 834   Pain   In / Out Neck 4; back 2 0   Subjective Functional Status/Changes: Patient states that she continues to have pain with certain activities, but the pain does not seem to last long.      TREATMENT AREA =  Other low back pain [M54.59]     OBJECTIVE      Heat (UNBILLED):  location/position: sitting; neck & back .    Min Rationale   10 decrease pain to improve patient's ability to progress to PLOF and address remaining functional goals.     Skin assessment post-treatment:   Intact      Therapeutic Procedures:     68807 Therapeutic Exercise (timed):  increase ROM, strength, coordination, balance, and proprioception to improve patient's ability to progress to PLOF and address remaining functional goals.   Tx Min Billable or 1:1 Min   (if diff from Tx Min) Details:   21   See flow sheet as applicable      61604 Neuromuscular Re-Education (timed):  improve balance, coordination, kinesthetic sense, posture, core stability and proprioception to improve patient's ability to develop conscious control of individual muscles and awareness of position of extremities in order to progress to PLOF and address remaining functional goals.   Tx Min Billable or 1:1 Min   (if diff from Tx Min) Details:   10   See flow sheet as applicable      35626 Manual Therapy (timed):  decrease pain, increase ROM, increase tissue extensibility, decrease edema, decrease trigger points, and increase postural awareness to improve patient's ability to progress to PLOF and address remaining functional goals.  The manual therapy interventions were performed at a separate and distinct time from the therapeutic activities interventions .   Tx Min Billable or 1:1 Min

## 2025-02-11 ENCOUNTER — TELEPHONE (OUTPATIENT)
Facility: HOSPITAL | Age: 55
End: 2025-02-11

## 2025-02-11 ENCOUNTER — HOSPITAL ENCOUNTER (OUTPATIENT)
Facility: HOSPITAL | Age: 55
Setting detail: RECURRING SERIES
End: 2025-02-11
Payer: COMMERCIAL

## 2025-02-13 ENCOUNTER — HOSPITAL ENCOUNTER (OUTPATIENT)
Facility: HOSPITAL | Age: 55
Setting detail: RECURRING SERIES
Discharge: HOME | End: 2025-02-16
Payer: COMMERCIAL

## 2025-02-13 PROCEDURE — 97112 NEUROMUSCULAR REEDUCATION: CPT

## 2025-02-13 PROCEDURE — 97110 THERAPEUTIC EXERCISES: CPT

## 2025-02-13 PROCEDURE — 97140 MANUAL THERAPY 1/> REGIONS: CPT

## 2025-02-13 NOTE — PROGRESS NOTES
1/21/25  Current: remains: worst neck pain 7/10 back 4/10. 2/4/25  2. Patient will demonstrate AROM Lumbar Spine  WNL with minimal c/o pain to increase ease of ADLs.  At PN: AROM Lumbar Spine 25% in flex, R and L rotation with pain with both. 1/21/25.  3. Patient will improve her Oswestry score by 9 points to indicate increased functional mobility.  At PN: Oswestry 32% 1/21/25  4. Patient will be able to return to her normal gym workout in order to improve her overall fitness.  At PN: Progressing, started back on walking routine but nothing more, not yet back to workout classes or weight lifting. 1/21/25.  Current; not met; pt has not initiated a regular work out regime. 2/6/25  5. Patient will demonstrate cervical extension 65, right side bend 50, left side bend 50, right rotation 70, left rotation 70 AROM to increase ease of ADLs.  At PN:  Cervical Flexion 65 degs, Extension 60 degs, SB R and L 40 degs with pain, Rotation R 65 degs and L 62 deg with pain. 1/21/25  6. Patient will increase NDI score to 25% to indicate increased functional mobility.  At Pnt]:  NDI 50% 1/21/25  7. Patient will be able to centralize symptoms in the LUE to improve functional abilities and ADLs.   AT PN::  100% numbness.tingling in LUE with all ADLs and IADLs. 1/21/25  Current: progressing: patient states she had radicular symptoms 2-3 times yesterday. 2/13/25        Next PN/ RC due 2/20/25  Auth due BRENDA    PLAN  - Continue Plan of Care    BASSEM PERAZA PTA    2/13/2025    7:48 AM  If an interpreting service was utilized for treatment of this patient, the contents of this document represent the material reviewed with the patient via the .     Future Appointments   Date Time Provider Department Center   2/19/2025  7:40 AM Bassem Peraza PTA [facility]   2/21/2025  7:40 [facility]   2/25/2025  7:40 AM Bassem Peraza PTA [facility]   2/27/2025  7:40 AM Bassem Peraza PTA [facility]   9/9/2025

## 2025-02-17 ENCOUNTER — TELEPHONE (OUTPATIENT)
Facility: HOSPITAL | Age: 55
End: 2025-02-17

## 2025-02-19 ENCOUNTER — APPOINTMENT (OUTPATIENT)
Facility: HOSPITAL | Age: 55
End: 2025-02-19
Payer: COMMERCIAL

## 2025-02-19 ENCOUNTER — TELEPHONE (OUTPATIENT)
Facility: HOSPITAL | Age: 55
End: 2025-02-19
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
## 2025-02-21 ENCOUNTER — APPOINTMENT (OUTPATIENT)
Facility: HOSPITAL | Age: 55
End: 2025-02-21
Payer: COMMERCIAL

## 2025-02-25 ENCOUNTER — HOSPITAL ENCOUNTER (OUTPATIENT)
Facility: HOSPITAL | Age: 55
Setting detail: RECURRING SERIES
Discharge: HOME | End: 2025-02-28
Payer: COMMERCIAL

## 2025-02-25 PROCEDURE — 97112 NEUROMUSCULAR REEDUCATION: CPT

## 2025-02-25 PROCEDURE — 97140 MANUAL THERAPY 1/> REGIONS: CPT

## 2025-02-25 PROCEDURE — 97110 THERAPEUTIC EXERCISES: CPT

## 2025-02-25 NOTE — PROGRESS NOTES
[facility]  [address] [facility] [address] Ph: [phone] Fx: [phone]  PHYSICAL THERAPY PROGRESS NOTE  Patient Name: Melodie Mcnamara : 1970   Treatment/Medical Diagnosis: Other low back pain [M54.59]   Referral Source: Juancarlos Welch MD     Date of Initial Visit: 2025 Attended Visits: 10 Missed Visits: 3     SUMMARY OF TREATMENT    Patient has improved with overall symptoms of lower back. Patient has improved to lumbar AROM WNL and has minimal symptoms aggravations with house hold chores. Patient continues to have radicular symptoms along left UE, limited cervical ROM in all planes except B rotation.        CURRENT STATUS    Short Term Goals: To be accomplished in 1 WEEKS  1.  Patient will become proficient in their HEP and will be compliant in performing that program.  At PN:  Progressing: patient has initiated HEP. Reports compliance for low back. 25  Current: progressing: patient reports performing HEP. 25     Long Term Goals: To be accomplished in 8 WEEKS  1. Patient's pain level will be 1-2/10 with activity in order to improve patient's ability to perform normal ADLs.  At PN: Progressin/10-8/10 LBP and the neck 7-8/10 to 10/10 at highest. 25  Current: remains: worst neck pain 6/10 back 2/10. 25  2. Patient will demonstrate AROM Lumbar Spine  WNL with minimal c/o pain to increase ease of ADLs.  At PN: AROM Lumbar Spine 25% in flex, R and L rotation with pain with both. 25.  Current; MET: Lumbar AROM WNL in all planes with no increase in pain. 25  3. Patient will improve her Oswestry score by 9 points to indicate increased functional mobility.  At PN: Oswestry 32% 25  Current; MET: EMILY 12% 25  4. Patient will be able to return to her normal gym workout in order to improve her overall fitness.  At PN: Progressing, started back on walking routine but nothing more, not yet back to workout classes 
perform normal ADLs.  At PN: Progressin/10-8/10 LBP and the neck 7-8/10 to 10/10 at highest. 25  Current: remains: worst neck pain 6/10 back 2/10. 25  2. Patient will demonstrate AROM Lumbar Spine  WNL with minimal c/o pain to increase ease of ADLs.  At PN: AROM Lumbar Spine 25% in flex, R and L rotation with pain with both. 25.  Current; MET: Lumbar AROM WNL in all planes with no increase in pain. 25  3. Patient will improve her Oswestry score by 9 points to indicate increased functional mobility.  At PN: Oswestry 32% 25  Current; MET: EMILY 12% 25  4. Patient will be able to return to her normal gym workout in order to improve her overall fitness.  At PN: Progressing, started back on walking routine but nothing more, not yet back to workout classes or weight lifting. 25.  Current; not met; pt has not initiated a regular work out regime. 25  5. Patient will demonstrate cervical extension 65, right side bend 50, left side bend 50, right rotation 70, left rotation 70 AROM to increase ease of ADLs.  At PN:  Cervical Flexion 65 degs, Extension 60 degs, SB R and L 40 degs with pain, Rotation R 65 degs and L 62 deg with pain. 25  Current: regressed: cervical flexion 60 deg, extension 20 deg, right 35 deg, left SB 30 deg, B rotation 70 deg, 2025  6. Patient will increase NDI score to 25% to indicate increased functional mobility.  At Pnt]:  NDI 50% 25  Current: progressing; NDI 28% 25  7. Patient will be able to centralize symptoms in the LUE to improve functional abilities and ADLs.   AT PN::  100% numbness.tingling in LUE with all ADLs and IADLs. 25  Current: progressing: patient states she has not had n/t in the last three days, until she did the UBE at  therapy. 25        Next PN/ RC due today  Auth due RBENDA    PLAN  - Continue Plan of Care    EUGENIO PERAZA, PTA    2025    7:40 AM  If an interpreting service was utilized for treatment of

## 2025-02-27 ENCOUNTER — HOSPITAL ENCOUNTER (OUTPATIENT)
Facility: HOSPITAL | Age: 55
Setting detail: RECURRING SERIES
End: 2025-02-27
Payer: COMMERCIAL

## 2025-02-27 PROCEDURE — 97140 MANUAL THERAPY 1/> REGIONS: CPT

## 2025-02-27 PROCEDURE — 97110 THERAPEUTIC EXERCISES: CPT

## 2025-02-27 PROCEDURE — 97112 NEUROMUSCULAR REEDUCATION: CPT

## 2025-02-27 NOTE — PROGRESS NOTES
PHYSICAL / OCCUPATIONAL THERAPY - DAILY TREATMENT NOTE    Patient Name: Melodie Mcnamara    Date: 2025    : 1970  Insurance: Payor: PRABHA LUZBS / [place] [employer] / Product Type: *No Product type* /      Patient  verified Yes     Visit #   Current / Total 1 10   Time   In / Out [id number]   Pain   In / Out Neck 5 back 1 Neck 5; back 1   Subjective Functional Status/Changes: Patient reports the followed up with her doctor and received a cortisone shot. Patient states she is going back next week to have further testing done but unsure of what type of test.      TREATMENT AREA =  Other low back pain [M54.59]     OBJECTIVE      Heat (UNBILLED):  location/position: sitting; neck & back .    Min Rationale   10 decrease pain to improve patient's ability to progress to PLOF and address remaining functional goals.     Skin assessment post-treatment:   Intact      Therapeutic Procedures:     46665 Therapeutic Exercise (timed):  increase ROM, strength, coordination, balance, and proprioception to improve patient's ability to progress to PLOF and address remaining functional goals.   Tx Min Billable or 1:1 Min   (if diff from Tx Min) Details:   20   See flow sheet as applicable      53694 Neuromuscular Re-Education (timed):  improve balance, coordination, kinesthetic sense, posture, core stability and proprioception to improve patient's ability to develop conscious control of individual muscles and awareness of position of extremities in order to progress to PLOF and address remaining functional goals.   Tx Min Billable or 1:1 Min   (if diff from Tx Min) Details:   12   See flow sheet as applicable      75955 Manual Therapy (timed):  decrease pain, increase ROM, increase tissue extensibility, decrease edema, decrease trigger points, and increase postural awareness to improve patient's ability to progress to PLOF and address remaining functional goals.  The manual therapy interventions were performed at a separate and  right side bend 50, left side bend 50, right rotation 70, left rotation 70 AROM to increase ease of ADLs.  At PN: regressed: cervical flexion 60 deg, extension 20 deg, right 35 deg, left SB 30 deg, B rotation 70 deg, 2/25/2025  4. Patient will increase NDI score to 25% to indicate increased functional mobility.  At Pnt]:  progressing; NDI 28% 2/25/25  5. Patient will be able to centralize symptoms in the LUE to improve functional abilities and ADLs.   AT PN:progressing: patient states she has not had n/t in the last three days, until she did the UBE at  therapy. 2/25/25    Next PN/ RC due 3/24/2025  Auth due BRENDA    PLAN  - Continue Plan of Care    EUGENIO PERAZA, PTA    2/27/2025    7:55 AM  If an interpreting service was utilized for treatment of this patient, the contents of this document represent the material reviewed with the patient via the .     Future Appointments   Date Time Provider Department Center   3/6/2025  7:40 AM Roxy Gutierrez PT [facility]   3/11/2025  7:40 AM Roxy Gutierrez PT [facility]   3/13/2025  7:40 AM Roxy Gutierrez PT [facility]   9/9/2025  9:15 AM Handy Lee Sr., MD [facility]

## 2025-03-06 ENCOUNTER — APPOINTMENT (OUTPATIENT)
Facility: HOSPITAL | Age: 55
End: 2025-03-06
Payer: COMMERCIAL

## 2025-03-11 ENCOUNTER — HOSPITAL ENCOUNTER (OUTPATIENT)
Facility: HOSPITAL | Age: 55
Setting detail: RECURRING SERIES
Discharge: HOME | End: 2025-03-14
Payer: COMMERCIAL

## 2025-03-11 PROCEDURE — 97110 THERAPEUTIC EXERCISES: CPT

## 2025-03-11 PROCEDURE — 97530 THERAPEUTIC ACTIVITIES: CPT

## 2025-03-11 NOTE — PROGRESS NOTES
PHYSICAL / OCCUPATIONAL THERAPY - DAILY TREATMENT NOTE    Patient Name: Melodie Mcnamara    Date: 3/11/2025    : 1970  Insurance: Payor: PRABHA LUZBS / [place] [employer] / Product Type: *No Product type* /      Patient  verified Yes     Visit #   Current / Total 2 10   Time   In / Out 7:49 8:29   Pain   In / Out Neck 9, back 1 Neck 9, back 1   Subjective Functional Status/Changes: Pt says she got a cortisone injection at [facility] 5 days ago and she has had pain at her left UE since. Pt notes her neck and left UE pain is very high today since receiving the injection.      TREATMENT AREA =  Other low back pain    OBJECTIVE    Heat (UNBILLED):  location/position: pt seated with heat applied to neck.    Min Rationale   10 decrease pain and increase tissue extensibility to improve patient's ability to progress to PLOF and address remaining functional goals.     Skin assessment post-treatment:   Intact     Therapeutic Procedures:    71913 Therapeutic Exercise (timed):  increase ROM, strength, coordination, balance, and proprioception to improve patient's ability to progress to PLOF and address remaining functional goals.   Tx Min Billable or 1:1 Min   (if diff from Tx Min) Details:   15  See flow sheet as applicable     47035 Therapeutic Activity (timed):  use of dynamic activities replicating functional movements to increase ROM, strength, coordination, balance, and proprioception in order to improve patient's ability to progress to PLOF and address remaining functional goals.   Tx Min Billable or 1:1 Min   (if diff from Tx Min) Details:   15  See flow sheet as applicable       30   BC Totals Reminder: bill using total billable min of TIMED therapeutic procedures (example: do not include dry needle or estim unattended, both untimed codes, in totals to left)  8-22 min = 1 unit; 23-37 min = 2 units; 38-52 min = 3 units; 53-67 min = 4 units; 68-82 min = 5 units   Total Total     Charge Capture    [x]  Patient Education

## 2025-03-13 ENCOUNTER — HOSPITAL ENCOUNTER (OUTPATIENT)
Facility: HOSPITAL | Age: 55
Setting detail: RECURRING SERIES
Discharge: HOME | End: 2025-03-16
Payer: COMMERCIAL

## 2025-03-13 PROCEDURE — 97530 THERAPEUTIC ACTIVITIES: CPT

## 2025-03-13 PROCEDURE — 97112 NEUROMUSCULAR REEDUCATION: CPT

## 2025-03-13 NOTE — PROGRESS NOTES
68-82 min = 5 units   Total Total     Charge Capture    [x]  Patient Education billed concurrently with other procedures   [x] Review HEP    [] Progressed/Changed HEP, detail:    [] Other detail:       Objective Information/Functional Measures/Assessment    Pt presents with low back pain, neck pain, decreased lumbar spine mobility, decreased cervical mobility. Instructed pt in exercises to mobilize her cervical and lumbar spines to improve her mobility and reduce her neck and back pain. Instructed pt in exercises to stretch her UT and LS to reduce pain and tension at her neck and reduce her neck pain. Instructed pt in exercises to strengthen her parascapular muscles to increase activation of her lower trap and reduce activation of her UT. Instructed pt in exercises to strengthen her glutes and core to increase stability of her lumbar spine and reduce her low back pain. Provided verbal cues and demonstration to correct her form with exercises.     Patient will continue to benefit from skilled PT / OT services to modify and progress therapeutic interventions, analyze and address functional mobility deficits, analyze and address ROM deficits, analyze and address strength deficits, analyze and address soft tissue restrictions, analyze and cue for proper movement patterns, analyze and modify for postural abnormalities, and analyze and address imbalance/dizziness to address functional deficits and attain remaining goals.    Progress toward goals / Updated goals:  []  See Progress Note/Recertification    1. Patient's pain level will be 1-2/10 with activity in order to improve patient's ability to perform normal ADLs.  At PN:  remains: worst neck pain 6/10 back 2/10. 2/25/25  2. . Patient will be able to return to her normal gym workout in order to improve her overall fitness.  At PN:  not met; pt has not initiated a regular work out regime. 2/25/25  Current: pt is not doing her normal workouts at the gym d/t neck and back

## 2025-03-19 ENCOUNTER — HOSPITAL ENCOUNTER (OUTPATIENT)
Facility: HOSPITAL | Age: 55
Setting detail: RECURRING SERIES
Discharge: HOME | End: 2025-03-22
Payer: COMMERCIAL

## 2025-03-19 PROCEDURE — 97112 NEUROMUSCULAR REEDUCATION: CPT

## 2025-03-19 PROCEDURE — 97110 THERAPEUTIC EXERCISES: CPT

## 2025-03-19 NOTE — PROGRESS NOTES
Total Total     Charge Capture    [x]  Patient Education billed concurrently with other procedures   [x] Review HEP    [] Progressed/Changed HEP, detail:    [] Other detail:       Objective Information/Functional Measures/Assessment    No change in treatment program today as patient continue to require VCing to accurately complete. She is planning to discharge this Friday at her last scheduled visit.    Patient will continue to benefit from skilled PT / OT services to modify and progress therapeutic interventions, analyze and address functional mobility deficits, analyze and address ROM deficits, analyze and address strength deficits, analyze and address soft tissue restrictions, analyze and cue for proper movement patterns, analyze and modify for postural abnormalities, and analyze and address imbalance/dizziness to address functional deficits and attain remaining goals.    Progress toward goals / Updated goals:  []  See Progress Note/Recertification    1. Patient's pain level will be 1-2/10 with activity in order to improve patient's ability to perform normal ADLs.  At PN:  remains: worst neck pain 6/10 back 2/10. 2/25/25  Current: worse her neck pain 6.5/10. no change. 3/19/25.  2. . Patient will be able to return to her normal gym workout in order to improve her overall fitness.  At PN:  not met; pt has not initiated a regular work out regime. 2/25/25  Current: pt is not doing her normal workouts at the gym d/t neck and back pain; 3/11/25  3. Patient will demonstrate cervical extension 65, right side bend 50, left side bend 50, right rotation 70, left rotation 70 AROM to increase ease of ADLs.  At PN: regressed: cervical flexion 60 deg, extension 20 deg, right 35 deg, left SB 30 deg, B rotation 70 deg, 2/25/2025  4. Patient will increase NDI score to 25% to indicate increased functional mobility.  At PN:  progressing; NDI 28% 2/25/25  5. Patient will be able to centralize symptoms in the LUE to improve

## 2025-03-21 ENCOUNTER — HOSPITAL ENCOUNTER (OUTPATIENT)
Facility: HOSPITAL | Age: 55
Setting detail: RECURRING SERIES
Discharge: HOME | End: 2025-03-24
Payer: COMMERCIAL

## 2025-03-21 PROCEDURE — 97112 NEUROMUSCULAR REEDUCATION: CPT

## 2025-03-21 PROCEDURE — 97110 THERAPEUTIC EXERCISES: CPT

## 2025-03-21 PROCEDURE — 97530 THERAPEUTIC ACTIVITIES: CPT

## 2025-03-21 NOTE — PROGRESS NOTES
PHYSICAL / OCCUPATIONAL THERAPY - DAILY TREATMENT NOTE    Patient Name: Melodie Mcnamara    Date: 3/21/2025    : 1970  Insurance: Payor: PRABHA LUZBS / [place] [employer] / Product Type: *No Product type* /      Patient  verified Yes     Visit #   Current / Total 5 10   Time   In / Out 7:47 8:36   Pain   In / Out Neck 6, back 1 Neck 6, back 1   Subjective Functional Status/Changes: Pt states her neck pain is less than it was last week, but she continues to have neck pain with rotating her head. Pt has slight neck pain with tilting her head back, initially she had high pain levels with tilting her head back. Pt has back pain rarely with bending forward to  something light. Pt says initially she avoiding lifting objects like a laundry basket, now she can lift and has a little back pain. Initially pt had a lot of pain with squatting, now she has slight back pain with squatting.       TREATMENT AREA =  Other low back pain    OBJECTIVE    Heat (UNBILLED):  location/position: pt seated with hot pack applied to her cervical and lumbar regions.    Min Rationale   10 decrease pain and increase tissue extensibility to improve patient's ability to progress to PLOF and address remaining functional goals.     Skin assessment post-treatment:   Intact     Therapeutic Procedures:    46326 Therapeutic Exercise (timed):  increase ROM, strength, coordination, balance, and proprioception to improve patient's ability to progress to PLOF and address remaining functional goals.   Tx Min Billable or 1:1 Min   (if diff from Tx Min) Details:   14  See flow sheet as applicable     59451 Neuromuscular Re-Education (timed):  improve balance, coordination, kinesthetic sense, posture, core stability and proprioception to improve patient's ability to develop conscious control of individual muscles and awareness of position of extremities in order to progress to PLOF and address remaining functional goals.   Tx Min Billable or 1:1 Min   (if

## 2025-03-21 NOTE — THERAPY DISCHARGE
AROM to increase ease of ADLs.  At PN: regressed: cervical flexion 60 deg, extension 20 deg, right 35 deg, left SB 30 deg, B rotation 70 deg, 2/25/2025  Current: cervical flexion 50, extension 42, cervical side bending L 35 deg/R 40 deg, cervical rotation R 47, L 50; 3/21/25  Goal Met?  no  4. Patient will increase NDI score to 25% to indicate increased functional mobility.  At PN:  progressing; NDI 28% 2/25/25  Current: NDI 30%  Goal Met?  no  5. Patient will be able to centralize symptoms in the LUE to improve functional abilities and ADLs.   AT PN: progressing: patient states she has not had n/t in the last three days, until she did the UBE at  therapy. 2/25/25  Current: pt says she has numbness and tingling at left UE; 3/21/25  Goal Met?  no      non-Medicare    RECOMMENDATIONS  Discontinue therapy due to lack of appreciable progress towards goals.    If you have any questions/comments please contact us directly at [phone].   Thank you for allowing us to assist in the care of your patient.    RAZ HURLEY, PT       3/21/2025       8:53 AM    Not Medicaid Ins, no signature required for DC